# Patient Record
Sex: MALE | Race: WHITE | NOT HISPANIC OR LATINO | Employment: FULL TIME | URBAN - METROPOLITAN AREA
[De-identification: names, ages, dates, MRNs, and addresses within clinical notes are randomized per-mention and may not be internally consistent; named-entity substitution may affect disease eponyms.]

---

## 2018-11-02 ENCOUNTER — TRANSCRIBE ORDERS (OUTPATIENT)
Dept: ADMINISTRATIVE | Facility: HOSPITAL | Age: 38
End: 2018-11-02

## 2018-11-02 ENCOUNTER — APPOINTMENT (OUTPATIENT)
Dept: LAB | Facility: HOSPITAL | Age: 38
End: 2018-11-02
Payer: COMMERCIAL

## 2018-11-02 DIAGNOSIS — C82.90 NHL (NODULAR HISTIOCYTIC LYMPHOMA) (HCC): Primary | ICD-10-CM

## 2018-11-02 DIAGNOSIS — C82.90 NHL (NODULAR HISTIOCYTIC LYMPHOMA) (HCC): ICD-10-CM

## 2018-11-02 LAB
CRP SERPL QL: 6 MG/L
ERYTHROCYTE [SEDIMENTATION RATE] IN BLOOD: 1 MM/HOUR (ref 0–10)
IGA SERPL-MCNC: 94 MG/DL (ref 70–400)
IGG SERPL-MCNC: 1220 MG/DL (ref 700–1600)
IGM SERPL-MCNC: 41 MG/DL (ref 40–230)

## 2018-11-02 PROCEDURE — 85652 RBC SED RATE AUTOMATED: CPT

## 2018-11-02 PROCEDURE — 82232 ASSAY OF BETA-2 PROTEIN: CPT

## 2018-11-02 PROCEDURE — 84165 PROTEIN E-PHORESIS SERUM: CPT | Performed by: PATHOLOGY

## 2018-11-02 PROCEDURE — 82784 ASSAY IGA/IGD/IGG/IGM EACH: CPT

## 2018-11-02 PROCEDURE — 86140 C-REACTIVE PROTEIN: CPT

## 2018-11-02 PROCEDURE — 36415 COLL VENOUS BLD VENIPUNCTURE: CPT

## 2018-11-02 PROCEDURE — 83883 ASSAY NEPHELOMETRY NOT SPEC: CPT

## 2018-11-02 PROCEDURE — 84166 PROTEIN E-PHORESIS/URINE/CSF: CPT | Performed by: PATHOLOGY

## 2018-11-02 PROCEDURE — 84166 PROTEIN E-PHORESIS/URINE/CSF: CPT | Performed by: NURSE PRACTITIONER

## 2018-11-02 PROCEDURE — 84165 PROTEIN E-PHORESIS SERUM: CPT

## 2018-11-03 LAB
KAPPA LC FREE SER-MCNC: 15.7 MG/L (ref 3.3–19.4)
KAPPA LC FREE/LAMBDA FREE SER: 1.3 {RATIO} (ref 0.26–1.65)
LAMBDA LC FREE SERPL-MCNC: 12.1 MG/L (ref 5.7–26.3)

## 2018-11-05 LAB
ALBUMIN SERPL ELPH-MCNC: 4.35 G/DL (ref 3.5–5)
ALBUMIN SERPL ELPH-MCNC: 63 % (ref 52–65)
ALBUMIN UR ELPH-MCNC: 100 %
ALPHA1 GLOB MFR UR ELPH: 0 %
ALPHA1 GLOB SERPL ELPH-MCNC: 0.32 G/DL (ref 0.1–0.4)
ALPHA1 GLOB SERPL ELPH-MCNC: 4.6 % (ref 2.5–5)
ALPHA2 GLOB MFR UR ELPH: 0 %
ALPHA2 GLOB SERPL ELPH-MCNC: 0.55 G/DL (ref 0.4–1.2)
ALPHA2 GLOB SERPL ELPH-MCNC: 7.9 % (ref 7–13)
B-GLOBULIN MFR UR ELPH: 0 %
B2 MICROGLOB SERPL-MCNC: 1.4 MG/L (ref 0.6–2.4)
BETA GLOB ABNORMAL SERPL ELPH-MCNC: 0.43 G/DL (ref 0.4–0.8)
BETA1 GLOB SERPL ELPH-MCNC: 6.2 % (ref 5–13)
BETA2 GLOB SERPL ELPH-MCNC: 3.8 % (ref 2–8)
BETA2+GAMMA GLOB SERPL ELPH-MCNC: 0.26 G/DL (ref 0.2–0.5)
GAMMA GLOB ABNORMAL SERPL ELPH-MCNC: 1 G/DL (ref 0.5–1.6)
GAMMA GLOB MFR UR ELPH: 0 %
GAMMA GLOB SERPL ELPH-MCNC: 14.5 % (ref 12–22)
IGG/ALB SER: 1.7 {RATIO} (ref 1.1–1.8)
PROT PATTERN SERPL ELPH-IMP: NORMAL
PROT PATTERN UR ELPH-IMP: NORMAL
PROT SERPL-MCNC: 6.9 G/DL (ref 6.4–8.2)
PROT UR-MCNC: 12 MG/DL

## 2023-09-05 ENCOUNTER — HOSPITAL ENCOUNTER (OUTPATIENT)
Dept: RADIOLOGY | Facility: HOSPITAL | Age: 43
Discharge: HOME | End: 2023-09-05
Payer: COMMERCIAL

## 2023-09-05 DIAGNOSIS — N45.1 EPIDIDYMITIS: ICD-10-CM

## 2023-09-05 PROCEDURE — 76870 US EXAM SCROTUM: CPT

## 2023-11-02 ENCOUNTER — OFFICE VISIT (OUTPATIENT)
Dept: GASTROENTEROLOGY | Facility: CLINIC | Age: 43
End: 2023-11-02
Payer: COMMERCIAL

## 2023-11-02 VITALS
BODY MASS INDEX: 25.05 KG/M2 | DIASTOLIC BLOOD PRESSURE: 83 MMHG | WEIGHT: 175 LBS | SYSTOLIC BLOOD PRESSURE: 117 MMHG | HEIGHT: 70 IN | HEART RATE: 86 BPM

## 2023-11-02 DIAGNOSIS — R19.4 CHANGE IN BOWEL HABIT: Primary | ICD-10-CM

## 2023-11-02 DIAGNOSIS — K62.5 RECTAL BLEEDING: ICD-10-CM

## 2023-11-02 PROCEDURE — 99203 OFFICE O/P NEW LOW 30 MIN: CPT | Performed by: NURSE PRACTITIONER

## 2023-11-02 RX ORDER — POLYETHYLENE GLYCOL 3350, SODIUM CHLORIDE, SODIUM BICARBONATE, POTASSIUM CHLORIDE 420; 11.2; 5.72; 1.48 G/4L; G/4L; G/4L; G/4L
4000 POWDER, FOR SOLUTION ORAL ONCE
Qty: 4000 ML | Refills: 0 | Status: SHIPPED | OUTPATIENT
Start: 2023-11-02 | End: 2023-11-02

## 2023-11-02 NOTE — PROGRESS NOTES
[facility] - Outpatient Consultation  Tyron Elizabeth 37 y.o. male MRN: [id number]  Encounter: [id number]          ASSESSMENT AND PLAN:      1. Change in bowel habit  Pt with PMH of NHL now in remission with change in stool consistency from formed stool daily to small amounts of mushy type stool daily with associated abdominal discomfort/bloating and increased effort to move his bowels. He denies any changes in diet (vegetarian) & doesn't take any medications. Baseline labs done by his job a few months ago were reportedly normal. He started fiber supplementation with Metamucil 2 weeks ago which has helped slightly. Reports history of 4 colonoscopys & 1 EGD done in the past for rectal bleeding/abdominal pain, last one 5 years ago all reportedly normal. Symptoms seem likely related to slow transit constipation vs IBS-C, will check thyroid panel and schedule colonoscopy to evaluate for any intraluminal pathology. Prep and procedure explained. -     TSH, 3rd generation with Free T4 reflex; Future  -     TSH, 3rd generation with Free T4 reflex  -     Colonoscopy; Future; Expected date: 11/02/2023  -     polyethylene glycol-electrolytes (TriLyte) 4000 mL solution; Take 4,000 mL by mouth once for 1 dose Take 4000 mL by mouth once for 1 dose. Use as directed    2. Rectal bleeding: Reports intermittent small amounts of BRBPR with BM/wiping associated with straining and rectal discomfort. Likely from rectal outlet pathology from hemorrhoids or fissure, colonoscopy scheduled for further evaluation to ensure no other intraluminal pathology such as a lesion or chronic inflammation. Pt will continue with Metamucil fiber supplementation and OTC hemorrhoid treatment. If ineffective discussed the use of Miralax in addition.  -     Colonoscopy; Future; Expected date: 11/02/2023  -     polyethylene glycol-electrolytes (TriLyte) 4000 mL solution;  Take 4,000 mL by mouth once for 1 dose Take 4000 mL by mouth once for 1 dose. Use as directed        ______________________________________________________________________    HPI:  Fouzia Hernández is a 37 y.o. male with past medical history of non-hodgkin's lymphoma 2005 now in remission, hernia repair, and kidney surgery who presents to establish care due to change in bowel habit and rectal bleeding. He noticed about 4-5 months ago that BM changed in consistency to little pieces of stool and mushy stool instead of formed. It took more effort to pass and has noticed some abdominal discomfort/bloating in addition which seems to improve after BM. Over the last few days has noticed some bleeding with BM, rectal discomfort, and feels like there's a blockage in his rectum on one side possibly a hemorrhoid. He denies any change in diet-follows a vegetarian diet. Doesn't take any medications. Denies any unintended weight loss or family history of colon cancer. He had baseline labs through his job a few months ago which were reportedly normal.    He has had 4 colonoscopys done in the past, last one 5 years ago and everything was reportedly normal. He believes he has also had an EGD in the past, again reportedly normal. Denies any dysphagia, heartburn, nausea/vomiting, upper abdominal pain. Used to drink alcohol but quit about a month ago because he felt like he was drinking too much. Denies any tobacco or drug use.       REVIEW OF SYSTEMS:  Answers submitted by the patient for this visit:  Abdominal Pain Questionnaire (Submitted on 11/1/2023)  Chief Complaint: Abdominal pain  Chronicity: new  Onset: more than 1 month ago  Onset quality: gradual  Frequency: intermittently  Episode duration: 1 Hours  Progression since onset: waxing and waning  Pain location: generalized abdominal region  Pain - numeric: 3/10  Pain quality: cramping, dull, a sensation of fullness  anorexia: No  arthralgias: No  belching: No  constipation: Yes  diarrhea: Yes  dysuria: No  fever: No  flatus: No  frequency: No  headaches: No  hematochezia: Yes  hematuria: No  melena: No  myalgias: No  nausea: No  weight loss: No  vomiting: No  Aggravated by: nothing  Relieved by: nothing  Diagnostic workup: ultrasound    CONSTITUTIONAL: Denies any fever, chills, rigors, and weight loss. HEENT: No earache or tinnitus. CARDIOVASCULAR: No chest pain or palpitations. RESPIRATORY: Denies any cough, hemoptysis, shortness of breath or dyspnea on exertion. GASTROINTESTINAL: As noted in the History of Present Illness. GENITOURINARY: Denies any hematuria or dysuria. NEUROLOGIC: No dizziness or vertigo. MUSCULOSKELETAL: Denies any joint swellings. SKIN: Denies skin rashes or itching. ENDOCRINE: Denies excessive thirst. Denies intolerance to heat or cold. PSYCHOSOCIAL: Denies depression or anxiety. Denies any recent memory loss. Historical Information   Past Medical History:   Diagnosis Date    Cancer ([address]) 2005    NHL     Past Surgical History:   Procedure Laterality Date    ABDOMINAL SURGERY  2021    Kidney    COLONOSCOPY  Multiple    HERNIA REPAIR  2005    KIDNEY SURGERY       Social History   Social History     Substance and Sexual Activity   Alcohol Use Not Currently     Social History     Substance and Sexual Activity   Drug Use Never     Social History     Tobacco Use   Smoking Status Never   Smokeless Tobacco Never     History reviewed. No pertinent family history. Meds/Allergies       Current Outpatient Medications:     polyethylene glycol-electrolytes (TriLyte) 4000 mL solution    No Known Allergies        Objective     Blood pressure 117/83, pulse 86, height 5' 10" (1.778 m), weight 79.4 kg (175 lb). Body mass index is 25.11 kg/m². PHYSICAL EXAM:      General Appearance:   Alert, cooperative, no distress   HEENT:   Normocephalic, atraumatic, anicteric.      Neck:  Supple, symmetrical, trachea midline   Lungs:   Clear to auscultation bilaterally; no rales, rhonchi or wheezing; respirations unlabored    Heart[de-identified]   Regular rate and rhythm; no murmur. Abdomen:   Soft, non-tender, non-distended; normal bowel sounds; no masses, no organomegaly    Genitalia:   Deferred    Rectal:   Deferred    Extremities:  No cyanosis, clubbing or edema    Skin:  No jaundice, rashes, or lesions    Lymph nodes:  No palpable cervical lymphadenopathy        Lab Results:   No visits with results within 1 Day(s) from this visit. Latest known visit with results is:   Appointment on 11/02/2018   Component Date Value    Sed Rate 11/02/2018 1     CRP 11/02/2018 6.0 (H)     A/G Ratio 11/02/2018 1.70     Albumin Electrophoresis 11/02/2018 63.0     Albumin CONC 11/02/2018 4.35     Alpha 1 11/02/2018 4.6     ALPHA 1 CONC 11/02/2018 0.32     Alpha 2 11/02/2018 7.9     ALPHA 2 CONC 11/02/2018 0.55     Beta-1 11/02/2018 6.2     BETA 1 CONC 11/02/2018 0.43     Beta-2 11/02/2018 3.8     BETA 2 CONC 11/02/2018 0.26     Gamma Globulin 11/02/2018 14.5     GAMMA CONC 11/02/2018 1.00     SPEP Interpretation 11/02/2018 The serum total protein, albumin and electrophoresis are within normal limits. No monoclonal bands noted. Reviewed by: Yamileth Araujo. MD Missy ([id number]) **Electronic Signature**     Total Protein 11/02/2018 6.9     IGG 11/02/2018 1,220.0     IGA 11/02/2018 94.0     IGM 11/02/2018 41.0     Ig Kappa Free Light Chain 11/02/2018 15.7     Ig Lambda Free Light Kandis* 11/02/2018 12.1     Kappa/Lambda FluidC Ratio 11/02/2018 1.30     Beta-2 Microglobulin 11/02/2018 1.4          Radiology Results:   No results found.

## 2023-11-02 NOTE — PATIENT INSTRUCTIONS
Scheduled date of colonoscopy (as of today):11/21/23  Physician performing colonoscopy:Purnima  Location of colonoscopy:[facility]  Bowel prep reviewed with patient:Marvin  Instructions reviewed with patient by:Tonya LEWIS  Clearances:None   You have been prescribed miralax (polyethylene glycol) for treatment of your CONSTIPATION. Start 1 capful daily. Take this dose x 3 days. If your symptoms are improved, great! Continue this dose daily. If you have diarrhea (stools are loose, associated with accidents, or urgency) with this dose, cut down to 1/2 capful daily. Continue to titrate down until you find the dose for you. This might be 1 tbsp daily. Wait 3 days before making a change. If you have continued constipation with 1 capful daily, you may need a higher dose. Start with 1.5 capfuls daily and titrate upward. Eg might need 1 capful twice daily. There is no maximum dose, whatever works for you. Again, wait 3 days before making a change. Apply ice on your anus for 15 to 20 minutes every hour or as directed. Use an ice pack, or put crushed ice in a plastic bag. Cover it with a towel before you apply it to your anus. Ice helps prevent tissue damage and decreases swelling and pain. Take a sitz bath. Fill a bathtub with 4 to 6 inches of warm water. You may also use a sitz bath pan that fits inside a toilet bowl. Sit in the sitz bath for 15 minutes. Do this 3 times a day, and after each bowel movement. The warm water can help decrease pain and swelling. Keep your anal area clean. Gently wash the area with warm water daily. Soap may irritate the area. After a bowel movement, wipe with moist towelettes or wet toilet paper. Dry toilet paper can irritate the area. How can I help prevent hemorrhoids? Do not strain to have a bowel movement. Do not sit on the toilet too long. These actions can increase pressure on the tissues in your rectum and anus. Drink plenty of liquids.  Liquids can help prevent constipation. Ask how much liquid to drink each day and which liquids are best for you. Eat a variety of high-fiber foods. Examples include fruits, vegetables, and whole grains. Ask your healthcare provider how much fiber you need each day. You may need to take a fiber supplement. Exercise as directed. Exercise, such as walking, may make it easier to have a bowel movement. Ask your healthcare provider to help you create an exercise plan. Do not have anal sex. Anal sex can weaken the skin around your rectum and anus. Avoid heavy lifting. This can cause straining and increase your risk for another hemorrhoid. High Fiber Diet   WHAT YOU NEED TO KNOW:   A high-fiber diet includes foods that have a high amount of fiber. Fiber is the part of fruits, vegetables, and grains that is not broken down by your body. Fiber keeps your bowel movements regular. Fiber can also help lower your cholesterol level, control blood sugar in people with diabetes, and relieve constipation. Fiber can also help you control your weight because it helps you feel full faster. Most adults should eat 25 to 35 grams of fiber each day. Talk to your dietitian or healthcare provider about the amount of fiber you need.   DISCHARGE INSTRUCTIONS:   Good sources of fiber:       Foods with at least 4 grams of fiber per serving:      ? to ½ cup of high-fiber cereal (check the nutrition label on the box)    ½ cup of blackberries or raspberries    4 dried prunes    1 cooked artichoke    ½ cup of cooked legumes, such as lentils, or red, kidney, and pete beans    Foods with 1 to 3 grams of fiber per servin slice of whole-wheat, pumpernickel, or rye bread    ½ cup of cooked brown rice    4 whole-wheat crackers    1 cup of oatmeal    ½ cup of cereal with 1 to 3 grams of fiber per serving (check the nutrition label on the box)    1 small piece of fruit, such as an apple, banana, pear, kiwi, or orange    3 dates    ½ cup of canned apricots, fruit cocktail, peaches, or pears    ½ cup of raw or cooked vegetables, such as carrots, cauliflower, cabbage, spinach, squash, or corn  Ways that you can increase fiber in your diet:   Choose brown or wild rice instead of white rice. Use whole wheat flour in recipes instead of white or all-purpose flour. Add beans and peas to casseroles or soups. Choose fresh fruit and vegetables with peels or skins on instead of juices. Other diet guidelines to follow: Add fiber to your diet slowly. You may have abdominal discomfort, bloating, and gas if you add fiber to your diet too quickly. Drink plenty of liquids as you add fiber to your diet. You may have nausea or develop constipation if you do not drink enough water. Ask how much liquid to drink each day and which liquids are best for you. © Copyright Kirk Marcial 2023 Information is for End User's use only and may not be sold, redistributed or otherwise used for commercial purposes. The above information is an  only. It is not intended as medical advice for individual conditions or treatments. Talk to your doctor, nurse or pharmacist before following any medical regimen to see if it is safe and effective for you.

## 2023-11-02 NOTE — H&P (VIEW-ONLY)
[facility] - Outpatient Consultation  Soni Youngblood 37 y.o. male MRN: [id number]  Encounter: [id number]          ASSESSMENT AND PLAN:      1. Change in bowel habit  Pt with PMH of NHL now in remission with change in stool consistency from formed stool daily to small amounts of mushy type stool daily with associated abdominal discomfort/bloating and increased effort to move his bowels. He denies any changes in diet (vegetarian) & doesn't take any medications. Baseline labs done by his job a few months ago were reportedly normal. He started fiber supplementation with Metamucil 2 weeks ago which has helped slightly. Reports history of 4 colonoscopys & 1 EGD done in the past for rectal bleeding/abdominal pain, last one 5 years ago all reportedly normal. Symptoms seem likely related to slow transit constipation vs IBS-C, will check thyroid panel and schedule colonoscopy to evaluate for any intraluminal pathology. Prep and procedure explained. -     TSH, 3rd generation with Free T4 reflex; Future  -     TSH, 3rd generation with Free T4 reflex  -     Colonoscopy; Future; Expected date: 11/02/2023  -     polyethylene glycol-electrolytes (TriLyte) 4000 mL solution; Take 4,000 mL by mouth once for 1 dose Take 4000 mL by mouth once for 1 dose. Use as directed    2. Rectal bleeding: Reports intermittent small amounts of BRBPR with BM/wiping associated with straining and rectal discomfort. Likely from rectal outlet pathology from hemorrhoids or fissure, colonoscopy scheduled for further evaluation to ensure no other intraluminal pathology such as a lesion or chronic inflammation. Pt will continue with Metamucil fiber supplementation and OTC hemorrhoid treatment. If ineffective discussed the use of Miralax in addition.  -     Colonoscopy; Future; Expected date: 11/02/2023  -     polyethylene glycol-electrolytes (TriLyte) 4000 mL solution;  Take 4,000 mL by mouth once for 1 dose Take 4000 mL by mouth once for 1 dose. Use as directed        ______________________________________________________________________    HPI:  Adelso Cole is a 37 y.o. male with past medical history of non-hodgkin's lymphoma 2005 now in remission, hernia repair, and kidney surgery who presents to establish care due to change in bowel habit and rectal bleeding. He noticed about 4-5 months ago that BM changed in consistency to little pieces of stool and mushy stool instead of formed. It took more effort to pass and has noticed some abdominal discomfort/bloating in addition which seems to improve after BM. Over the last few days has noticed some bleeding with BM, rectal discomfort, and feels like there's a blockage in his rectum on one side possibly a hemorrhoid. He denies any change in diet-follows a vegetarian diet. Doesn't take any medications. Denies any unintended weight loss or family history of colon cancer. He had baseline labs through his job a few months ago which were reportedly normal.    He has had 4 colonoscopys done in the past, last one 5 years ago and everything was reportedly normal. He believes he has also had an EGD in the past, again reportedly normal. Denies any dysphagia, heartburn, nausea/vomiting, upper abdominal pain. Used to drink alcohol but quit about a month ago because he felt like he was drinking too much. Denies any tobacco or drug use.       REVIEW OF SYSTEMS:  Answers submitted by the patient for this visit:  Abdominal Pain Questionnaire (Submitted on 11/1/2023)  Chief Complaint: Abdominal pain  Chronicity: new  Onset: more than 1 month ago  Onset quality: gradual  Frequency: intermittently  Episode duration: 1 Hours  Progression since onset: waxing and waning  Pain location: generalized abdominal region  Pain - numeric: 3/10  Pain quality: cramping, dull, a sensation of fullness  anorexia: No  arthralgias: No  belching: No  constipation: Yes  diarrhea: Yes  dysuria: No  fever: No  flatus: No  frequency: No  headaches: No  hematochezia: Yes  hematuria: No  melena: No  myalgias: No  nausea: No  weight loss: No  vomiting: No  Aggravated by: nothing  Relieved by: nothing  Diagnostic workup: ultrasound    CONSTITUTIONAL: Denies any fever, chills, rigors, and weight loss. HEENT: No earache or tinnitus. CARDIOVASCULAR: No chest pain or palpitations. RESPIRATORY: Denies any cough, hemoptysis, shortness of breath or dyspnea on exertion. GASTROINTESTINAL: As noted in the History of Present Illness. GENITOURINARY: Denies any hematuria or dysuria. NEUROLOGIC: No dizziness or vertigo. MUSCULOSKELETAL: Denies any joint swellings. SKIN: Denies skin rashes or itching. ENDOCRINE: Denies excessive thirst. Denies intolerance to heat or cold. PSYCHOSOCIAL: Denies depression or anxiety. Denies any recent memory loss. Historical Information   Past Medical History:   Diagnosis Date    Cancer ([address]) 2005    NHL     Past Surgical History:   Procedure Laterality Date    ABDOMINAL SURGERY  2021    Kidney    COLONOSCOPY  Multiple    HERNIA REPAIR  2005    KIDNEY SURGERY       Social History   Social History     Substance and Sexual Activity   Alcohol Use Not Currently     Social History     Substance and Sexual Activity   Drug Use Never     Social History     Tobacco Use   Smoking Status Never   Smokeless Tobacco Never     History reviewed. No pertinent family history. Meds/Allergies       Current Outpatient Medications:     polyethylene glycol-electrolytes (TriLyte) 4000 mL solution    No Known Allergies        Objective     Blood pressure 117/83, pulse 86, height 5' 10" (1.778 m), weight 79.4 kg (175 lb). Body mass index is 25.11 kg/m². PHYSICAL EXAM:      General Appearance:   Alert, cooperative, no distress   HEENT:   Normocephalic, atraumatic, anicteric.      Neck:  Supple, symmetrical, trachea midline   Lungs:   Clear to auscultation bilaterally; no rales, rhonchi or wheezing; respirations unlabored    Heart[de-identified]   Regular rate and rhythm; no murmur. Abdomen:   Soft, non-tender, non-distended; normal bowel sounds; no masses, no organomegaly    Genitalia:   Deferred    Rectal:   Deferred    Extremities:  No cyanosis, clubbing or edema    Skin:  No jaundice, rashes, or lesions    Lymph nodes:  No palpable cervical lymphadenopathy        Lab Results:   No visits with results within 1 Day(s) from this visit. Latest known visit with results is:   Appointment on 11/02/2018   Component Date Value    Sed Rate 11/02/2018 1     CRP 11/02/2018 6.0 (H)     A/G Ratio 11/02/2018 1.70     Albumin Electrophoresis 11/02/2018 63.0     Albumin CONC 11/02/2018 4.35     Alpha 1 11/02/2018 4.6     ALPHA 1 CONC 11/02/2018 0.32     Alpha 2 11/02/2018 7.9     ALPHA 2 CONC 11/02/2018 0.55     Beta-1 11/02/2018 6.2     BETA 1 CONC 11/02/2018 0.43     Beta-2 11/02/2018 3.8     BETA 2 CONC 11/02/2018 0.26     Gamma Globulin 11/02/2018 14.5     GAMMA CONC 11/02/2018 1.00     SPEP Interpretation 11/02/2018 The serum total protein, albumin and electrophoresis are within normal limits. No monoclonal bands noted. Reviewed by: Samira Michelle. MD Missy ([id number]) **Electronic Signature**     Total Protein 11/02/2018 6.9     IGG 11/02/2018 1,220.0     IGA 11/02/2018 94.0     IGM 11/02/2018 41.0     Ig Kappa Free Light Chain 11/02/2018 15.7     Ig Lambda Free Light Kandis* 11/02/2018 12.1     Kappa/Lambda FluidC Ratio 11/02/2018 1.30     Beta-2 Microglobulin 11/02/2018 1.4          Radiology Results:   No results found.

## 2023-11-07 ENCOUNTER — ANESTHESIA EVENT (OUTPATIENT)
Dept: ANESTHESIOLOGY | Facility: HOSPITAL | Age: 43
End: 2023-11-07

## 2023-11-07 ENCOUNTER — ANESTHESIA (OUTPATIENT)
Dept: ANESTHESIOLOGY | Facility: HOSPITAL | Age: 43
End: 2023-11-07

## 2023-11-21 ENCOUNTER — ANESTHESIA EVENT (OUTPATIENT)
Dept: GASTROENTEROLOGY | Facility: AMBULARY SURGERY CENTER | Age: 43
End: 2023-11-21

## 2023-11-21 ENCOUNTER — ANESTHESIA (OUTPATIENT)
Dept: GASTROENTEROLOGY | Facility: AMBULARY SURGERY CENTER | Age: 43
End: 2023-11-21

## 2023-11-21 ENCOUNTER — HOSPITAL ENCOUNTER (OUTPATIENT)
Dept: GASTROENTEROLOGY | Facility: AMBULARY SURGERY CENTER | Age: 43
Setting detail: OUTPATIENT SURGERY
Discharge: HOME | End: 2023-11-21
Attending: INTERNAL MEDICINE
Payer: COMMERCIAL

## 2023-11-21 VITALS
TEMPERATURE: 98.9 F | SYSTOLIC BLOOD PRESSURE: 111 MMHG | HEART RATE: 60 BPM | DIASTOLIC BLOOD PRESSURE: 71 MMHG | RESPIRATION RATE: 18 BRPM | OXYGEN SATURATION: 96 %

## 2023-11-21 DIAGNOSIS — K62.5 RECTAL BLEEDING: ICD-10-CM

## 2023-11-21 DIAGNOSIS — R19.4 CHANGE IN BOWEL HABIT: ICD-10-CM

## 2023-11-21 PROCEDURE — 88305 TISSUE EXAM BY PATHOLOGIST: CPT | Performed by: PATHOLOGY

## 2023-11-21 RX ORDER — PROPOFOL 10 MG/ML
INJECTION, EMULSION INTRAVENOUS AS NEEDED
Status: DISCONTINUED | OUTPATIENT
Start: 2023-11-21 | End: 2023-11-21

## 2023-11-21 RX ORDER — SODIUM CHLORIDE, SODIUM LACTATE, POTASSIUM CHLORIDE, CALCIUM CHLORIDE 600; 310; 30; 20 MG/100ML; MG/100ML; MG/100ML; MG/100ML
125 INJECTION, SOLUTION INTRAVENOUS CONTINUOUS
Status: DISCONTINUED | OUTPATIENT
Start: 2023-11-21 | End: 2023-11-25 | Stop reason: HOSPADM

## 2023-11-21 RX ORDER — LIDOCAINE HYDROCHLORIDE 10 MG/ML
INJECTION, SOLUTION EPIDURAL; INFILTRATION; INTRACAUDAL; PERINEURAL AS NEEDED
Status: DISCONTINUED | OUTPATIENT
Start: 2023-11-21 | End: 2023-11-21

## 2023-11-21 RX ORDER — PROPOFOL 10 MG/ML
INJECTION, EMULSION INTRAVENOUS CONTINUOUS PRN
Status: DISCONTINUED | OUTPATIENT
Start: 2023-11-21 | End: 2023-11-21

## 2023-11-21 RX ADMIN — PROPOFOL 140 MG: 10 INJECTION, EMULSION INTRAVENOUS at 09:33

## 2023-11-21 RX ADMIN — PROPOFOL 30 MG: 10 INJECTION, EMULSION INTRAVENOUS at 09:36

## 2023-11-21 RX ADMIN — PROPOFOL 40 MG: 10 INJECTION, EMULSION INTRAVENOUS at 09:37

## 2023-11-21 RX ADMIN — PROPOFOL 140 MCG/KG/MIN: 10 INJECTION, EMULSION INTRAVENOUS at 09:33

## 2023-11-21 RX ADMIN — LIDOCAINE HYDROCHLORIDE 50 MG: 10 INJECTION, SOLUTION EPIDURAL; INFILTRATION; INTRACAUDAL at 09:33

## 2023-11-21 RX ADMIN — PROPOFOL 30 MG: 10 INJECTION, EMULSION INTRAVENOUS at 09:34

## 2023-11-21 RX ADMIN — SODIUM CHLORIDE, SODIUM LACTATE, POTASSIUM CHLORIDE, AND CALCIUM CHLORIDE: .6; .31; .03; .02 INJECTION, SOLUTION INTRAVENOUS at 09:29

## 2023-11-21 RX ADMIN — PROPOFOL 40 MG: 10 INJECTION, EMULSION INTRAVENOUS at 09:35

## 2023-11-21 NOTE — INTERVAL H&P NOTE
H&P reviewed. After examining the patient I find no changes in the patients condition since the H&P had been written.     Vitals:    11/21/23 0905   BP: 120/72   Pulse: 68   Resp: 18   Temp: 98.9 °F (37.2 °C)   SpO2: 98%

## 2023-11-21 NOTE — ANESTHESIA POSTPROCEDURE EVALUATION
Post-Op Assessment Note    CV Status:  Stable  Pain Score: 0    Pain management: adequate       Mental Status:  Alert and awake   Hydration Status:  Euvolemic   PONV Controlled:  Controlled   Airway Patency:  Patent     Post Op Vitals Reviewed: Yes    No anethesia notable event occurred. Staff: Anesthesiologist, CRNA   Comments: Report given to recovring RN.  VSS, Pt states he is comfortable              /51 (11/21/23 1000)    Temp      Pulse 75 (11/21/23 1000)   Resp 16 (11/21/23 1000)    SpO2 97 % (11/21/23 1000)

## 2023-11-21 NOTE — ANESTHESIA PREPROCEDURE EVALUATION
Procedure:  COLONOSCOPY    Relevant Problems   No relevant active problems        Physical Exam    Airway    Mallampati score: I  TM Distance: >3 FB  Neck ROM: full     Dental   No notable dental hx     Cardiovascular      Pulmonary      Other Findings        Anesthesia Plan  ASA Score- 1     Anesthesia Type- IV sedation with anesthesia with ASA Monitors. Additional Monitors:     Airway Plan:            Plan Factors-Exercise tolerance (METS): >4 METS. Chart reviewed. Patient summary reviewed. Induction- intravenous. Postoperative Plan-     Informed Consent- Anesthetic plan and risks discussed with patient. I personally reviewed this patient with the CRNA. Discussed and agreed on the Anesthesia Plan with the CRNA. Brandon Coffey

## 2023-11-27 PROCEDURE — 88305 TISSUE EXAM BY PATHOLOGIST: CPT | Performed by: PATHOLOGY

## 2025-01-04 ENCOUNTER — OFFICE VISIT (OUTPATIENT)
Dept: URGENT CARE | Facility: CLINIC | Age: 45
End: 2025-01-04
Payer: COMMERCIAL

## 2025-01-04 VITALS
HEIGHT: 70 IN | OXYGEN SATURATION: 98 % | RESPIRATION RATE: 16 BRPM | TEMPERATURE: 97.5 F | DIASTOLIC BLOOD PRESSURE: 78 MMHG | SYSTOLIC BLOOD PRESSURE: 120 MMHG | WEIGHT: 180 LBS | HEART RATE: 90 BPM | BODY MASS INDEX: 25.77 KG/M2

## 2025-01-04 DIAGNOSIS — J02.9 SORE THROAT: ICD-10-CM

## 2025-01-04 DIAGNOSIS — B34.9 ACUTE VIRAL SYNDROME: Primary | ICD-10-CM

## 2025-01-04 LAB — S PYO AG THROAT QL: NEGATIVE

## 2025-01-04 PROCEDURE — 87880 STREP A ASSAY W/OPTIC: CPT

## 2025-01-04 PROCEDURE — G0382 LEV 3 HOSP TYPE B ED VISIT: HCPCS

## 2025-01-04 PROCEDURE — 87636 SARSCOV2 & INF A&B AMP PRB: CPT

## 2025-01-04 PROCEDURE — S9083 URGENT CARE CENTER GLOBAL: HCPCS

## 2025-01-04 NOTE — PROGRESS NOTES
[facility]        NAME: Max Leavitt is a 44 y.o. male  : 1980    MRN: [id number]  DATE: 2025  TIME: 11:48 AM    Assessment and Plan   Acute viral syndrome [B34.9]  1. Acute viral syndrome        2. Sore throat  POCT rapid strepA    Throat culture    Covid/Flu- Office Collect Normal            Patient Instructions       Follow up with PCP in 3-5 days.  PrSaline nasal spray as often as needed in each nostril  Flonase nasal spray 1 spray to each nostril daily  Mucinex in the blue box    Strep in the office was negative we will send that out for throat culture comes back positive I will call you and we will start an antibiotic    COVID flu testing will take 24 hours to come back I will call you if it is positive    Tylenol or Motrin for fever or discomfort.  Increase your fluidsoceed to  ER if symptoms worsen.    If tests have been performed at [facility], our office will contact you with results if changes need to be made to the care plan discussed with you at the visit.  [facility]    Chief Complaint     Chief Complaint   Patient presents with    Sore Throat     Pt reports sore throat, h/a, and bilateral ear pain with onset of symptoms yesterday. Denies any fever. Managing symptoms with NyQuil and Tylenol. Covid hx one month ago.          History of Present Illness       This is a 44-year-old male who presents today with sore throat and ear pain that started yesterday.  He has been taking NyQuil for his symptoms.  Sore throat has gotten worse today and he has a headache.  He states that last month he tested positive for COVID and those symptoms have resolved.  Rapid strep in the office today was negative.  Will send for a flu and throat culture.  Patient aware that he will be called if they are positive.  Patient denies any chest pain shortness of breath.    Sore Throat   Associated symptoms include congestion, coughing and headaches. Pertinent  "negatives include no shortness of breath.       Review of Systems   Review of Systems   Constitutional:  Negative for diaphoresis, fatigue and fever.   HENT:  Positive for congestion, postnasal drip and sore throat.    Respiratory:  Positive for cough. Negative for shortness of breath.    Genitourinary: Negative.    Musculoskeletal: Negative.    Neurological:  Positive for headaches.         Current Medications     No current outpatient medications on file.    Current Allergies     Allergies as of 01/04/2025    (No Known Allergies)            The following portions of the patient's history were reviewed and updated as appropriate: allergies, current medications, past family history, past medical history, past social history, past surgical history and problem list.     Past Medical History:   Diagnosis Date    Cancer (HCC) 2005    [facility]       Past Surgical History:   Procedure Laterality Date    ABDOMINAL SURGERY  2021    Kidney    COLONOSCOPY  Multiple    HERNIA REPAIR  2005    KIDNEY SURGERY         No family history on file.      Medications have been verified.        Objective   /78 (BP Location: Right arm, Patient Position: Sitting, Cuff Size: Standard)   Pulse 90   Temp 97.5 °F (36.4 °C) (Tympanic)   Resp 16   Ht 5' 10\" (1.778 m)   Wt 81.6 kg (180 lb)   SpO2 98%   BMI 25.83 kg/m²   No LMP for male patient.       Physical Exam     Physical Exam  Vitals reviewed.   Constitutional:       General: He is not in acute distress.     Appearance: Normal appearance. He is well-developed. He is not ill-appearing.   HENT:      Head: Normocephalic and atraumatic.      Right Ear: Tympanic membrane and ear canal normal. No middle ear effusion. Tympanic membrane is not erythematous.      Left Ear: Tympanic membrane and ear canal normal.  No middle ear effusion. Tympanic membrane is not erythematous.      Nose: Congestion present.      Mouth/Throat:      Pharynx: Posterior oropharyngeal erythema present. No " pharyngeal swelling or oropharyngeal exudate.   Eyes:      Extraocular Movements: Extraocular movements intact.      Conjunctiva/sclera: Conjunctivae normal.   Neck:      Thyroid: No thyromegaly.   Cardiovascular:      Rate and Rhythm: Normal rate and regular rhythm.      Heart sounds: Normal heart sounds.   Pulmonary:      Effort: Pulmonary effort is normal.   Abdominal:      Palpations: Abdomen is soft.   Lymphadenopathy:      Cervical: No cervical adenopathy.   Skin:     General: Skin is warm.   Neurological:      General: No focal deficit present.      Mental Status: He is alert.   Psychiatric:         Mood and Affect: Mood normal.         Behavior: Behavior normal.         Judgment: Judgment normal.

## 2025-01-04 NOTE — PATIENT INSTRUCTIONS
Saline nasal spray as often as needed in each nostril  Flonase nasal spray 1 spray to each nostril daily  Mucinex in the blue box    Strep in the office was negative we will send that out for throat culture comes back positive I will call you and we will start an antibiotic    COVID flu testing will take 24 hours to come back I will call you if it is positive    Tylenol or Motrin for fever or discomfort.  Increase your fluids

## 2025-01-05 LAB
FLUAV RNA RESP QL NAA+PROBE: NEGATIVE
FLUBV RNA RESP QL NAA+PROBE: NEGATIVE
SARS-COV-2 RNA RESP QL NAA+PROBE: NEGATIVE

## 2025-01-07 LAB — B-HEM STREP SPEC QL CULT: NEGATIVE

## 2025-08-20 ENCOUNTER — TELEPHONE (OUTPATIENT)
Age: 45
End: 2025-08-20